# Patient Record
Sex: MALE
[De-identification: names, ages, dates, MRNs, and addresses within clinical notes are randomized per-mention and may not be internally consistent; named-entity substitution may affect disease eponyms.]

---

## 2018-05-11 ENCOUNTER — HOSPITAL ENCOUNTER (EMERGENCY)
Dept: HOSPITAL 97 - ER | Age: 43
Discharge: ANOTHER HOSPITAL | End: 2018-05-11
Payer: SELF-PAY

## 2018-05-11 DIAGNOSIS — D69.6: Primary | ICD-10-CM

## 2018-05-11 DIAGNOSIS — Z72.0: ICD-10-CM

## 2018-05-11 LAB
ALBUMIN SERPL BCP-MCNC: 4.2 G/DL (ref 3.2–5.5)
ALP SERPL-CCNC: 93 IU/L (ref 42–121)
ALT SERPL W P-5'-P-CCNC: 30 IU/L (ref 10–60)
AST SERPL W P-5'-P-CCNC: 27 IU/L (ref 10–42)
BUN BLD-MCNC: 16 MG/DL (ref 6–20)
CKMB CREATINE KINASE MB: 0.4 NG/ML (ref 0.3–4)
GLUCOSE SERPLBLD-MCNC: 95 MG/DL (ref 65–120)
HCT VFR BLD CALC: 24.3 % (ref 39.6–49)
HCT VFR BLD CALC: 26.4 % (ref 39.6–49)
INR BLD: 1.2
LIPASE SERPL-CCNC: 18 U/L (ref 22–51)
LYMPHOCYTES # SPEC AUTO: 5 K/UL (ref 0.7–4.9)
MAGNESIUM SERPL-MCNC: 1.9 MG/DL (ref 1.8–2.5)
MCH RBC QN AUTO: 32 PG (ref 27–35)
MCH RBC QN AUTO: 32.8 PG (ref 27–35)
MCV RBC: 91.7 FL (ref 80–100)
MCV RBC: 92.7 FL (ref 80–100)
METHAMPHET UR QL SCN: NEGATIVE
MORPHOLOGY BLD-IMP: (no result)
PMV BLD: 9 FL (ref 7.6–11.3)
PMV BLD: 9.5 FL (ref 7.6–11.3)
POTASSIUM SERPL-SCNC: 3.9 MEQ/L (ref 3.6–5)
RBC # BLD: 2.62 M/UL (ref 4.33–5.43)
RBC # BLD: 2.88 M/UL (ref 4.33–5.43)
THC SERPL-MCNC: NEGATIVE NG/ML

## 2018-05-11 PROCEDURE — 84484 ASSAY OF TROPONIN QUANT: CPT

## 2018-05-11 PROCEDURE — 96361 HYDRATE IV INFUSION ADD-ON: CPT

## 2018-05-11 PROCEDURE — 85025 COMPLETE CBC W/AUTO DIFF WBC: CPT

## 2018-05-11 PROCEDURE — 80048 BASIC METABOLIC PNL TOTAL CA: CPT

## 2018-05-11 PROCEDURE — 93005 ELECTROCARDIOGRAM TRACING: CPT

## 2018-05-11 PROCEDURE — 71045 X-RAY EXAM CHEST 1 VIEW: CPT

## 2018-05-11 PROCEDURE — 80074 ACUTE HEPATITIS PANEL: CPT

## 2018-05-11 PROCEDURE — 85610 PROTHROMBIN TIME: CPT

## 2018-05-11 PROCEDURE — 83880 ASSAY OF NATRIURETIC PEPTIDE: CPT

## 2018-05-11 PROCEDURE — 85027 COMPLETE CBC AUTOMATED: CPT

## 2018-05-11 PROCEDURE — 99285 EMERGENCY DEPT VISIT HI MDM: CPT

## 2018-05-11 PROCEDURE — 81003 URINALYSIS AUTO W/O SCOPE: CPT

## 2018-05-11 PROCEDURE — 85730 THROMBOPLASTIN TIME PARTIAL: CPT

## 2018-05-11 PROCEDURE — 82553 CREATINE MB FRACTION: CPT

## 2018-05-11 PROCEDURE — 80076 HEPATIC FUNCTION PANEL: CPT

## 2018-05-11 PROCEDURE — 80307 DRUG TEST PRSMV CHEM ANLYZR: CPT

## 2018-05-11 PROCEDURE — 83735 ASSAY OF MAGNESIUM: CPT

## 2018-05-11 PROCEDURE — 96375 TX/PRO/DX INJ NEW DRUG ADDON: CPT

## 2018-05-11 PROCEDURE — 96365 THER/PROPH/DIAG IV INF INIT: CPT

## 2018-05-11 PROCEDURE — 82550 ASSAY OF CK (CPK): CPT

## 2018-05-11 PROCEDURE — 85379 FIBRIN DEGRADATION QUANT: CPT

## 2018-05-11 PROCEDURE — 83690 ASSAY OF LIPASE: CPT

## 2018-05-11 PROCEDURE — 36415 COLL VENOUS BLD VENIPUNCTURE: CPT

## 2018-05-11 PROCEDURE — 71275 CT ANGIOGRAPHY CHEST: CPT

## 2018-05-11 NOTE — RAD REPORT
EXAM DESCRIPTION:  CT - Chest For Pe Angio - 5/11/2018 4:15 pm

 

CLINICAL HISTORY:  Chest pain.

 

COMPARISON:  None.

 

TECHNIQUE:  CT angiogram of the pulmonary arteries was performed with MIP.

 

All CT scans are performed using dose optimization technique as appropriate and may include automated
 exposure control or mA/KV adjustment according to patient size.

 

FINDINGS:  No evidence of pulmonary thromboembolism.

 

No acute aortic finding demonstrated.

 

The lungs are clear.

 

No significant pericardial or pleural fluid.

 

No concerning bony finding. Mild hepatomegaly.

 

IMPRESSION:  No evidence of pulmonary thromboembolism.

 

No acute lung findings.
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
## 2018-05-11 NOTE — RAD REPORT
EXAM DESCRIPTION:  [place] Single View5/11/2018 2:49 pm

 

CLINICAL HISTORY:  Chest pain

 

COMPARISON:  none

 

FINDINGS:   The lungs appear clear of acute infiltrate. The heart is normal size

 

IMPRESSION:   No acute abnormalities displayed

## 2018-05-11 NOTE — EKG
Test Date:    2018-05-11               Test Time:    14:55:40

Technician:   ANTOINE                                     

                                                     

MEASUREMENT RESULTS:                                       

Intervals:                                           

Rate:         86                                     

CT:           146                                    

QRSD:         88                                     

QT:           362                                    

QTc:          433                                    

Axis:                                                

P:            45                                     

CT:           146                                    

QRS:          -3                                     

T:            20                                     

                                                     

INTERPRETIVE STATEMENTS:                                       

                                                     

Normal sinus rhythm

Normal ECG

No previous ECG available for comparison



Electronically Signed On 05-11-18 15:47:24 CDT by Dario Hoffmann
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
